# Patient Record
Sex: FEMALE | Race: WHITE
[De-identification: names, ages, dates, MRNs, and addresses within clinical notes are randomized per-mention and may not be internally consistent; named-entity substitution may affect disease eponyms.]

---

## 2020-10-19 ENCOUNTER — HOSPITAL ENCOUNTER (EMERGENCY)
Dept: HOSPITAL 97 - ER | Age: 25
Discharge: HOME | End: 2020-10-19
Payer: COMMERCIAL

## 2020-10-19 VITALS — DIASTOLIC BLOOD PRESSURE: 89 MMHG | SYSTOLIC BLOOD PRESSURE: 121 MMHG | TEMPERATURE: 98 F

## 2020-10-19 VITALS — OXYGEN SATURATION: 100 %

## 2020-10-19 DIAGNOSIS — F17.210: ICD-10-CM

## 2020-10-19 DIAGNOSIS — Z88.3: ICD-10-CM
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
## 2020-10-19 DIAGNOSIS — Z88.8: ICD-10-CM

## 2020-10-19 DIAGNOSIS — S62.366A: Primary | ICD-10-CM
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
## 2020-10-19 PROCEDURE — 81025 URINE PREGNANCY TEST: CPT

## 2020-10-19 PROCEDURE — 99284 EMERGENCY DEPT VISIT MOD MDM: CPT

## 2020-10-19 PROCEDURE — 2W3CX1Z IMMOBILIZATION OF RIGHT LOWER ARM USING SPLINT: ICD-10-PCS

## 2020-10-19 PROCEDURE — 96372 THER/PROPH/DIAG INJ SC/IM: CPT

## 2020-10-19 PROCEDURE — 81003 URINALYSIS AUTO W/O SCOPE: CPT

## 2020-10-19 NOTE — EDPHYS
Physician Documentation                                                                           

 [facility]                                                                 

Name: Lindy Gibson                                                                           

Age: 25 yrs                                                                                       

Sex: Female                                                                                       

: 1995                                                                                   

MRN: [id number]                                                                                   

Arrival Date: 10/19/2020                                                                          

Time: 00:19                                                                                       

Account#: [id number]                                                                            

Bed 8                                                                                             

Private MD: Javier Boyle                                                                         

ED Physician Adin Early                                                                     

OB/GYN:                                                                                           

10/19                                                                                             

00:36 LMP 10/4/2020                                                                           rv  

                                                                                                  

Historical:                                                                                       

- Allergies:                                                                                      

00:35 Clindamycin;                                                                            rv  

00:35 STEROIDS;                                                                               rv  

- Home Meds:                                                                                      

00:35 None [Active];                                                                          rv  

- PMHx:                                                                                           

00:35 None;                                                                                   rv  

- PSHx:                                                                                           

00:35 None;                                                                                   rv  

                                                                                                  

- Immunization history:: Adult Immunizations up to date.                                          

- Social history:: Smoking status: Patient reports the use of cigarette tobacco                   

  products, smokes one pack cigarettes per day.                                                   

                                                                                                  

                                                                                                  

Vital Signs:                                                                                      

00:29  / 93; Pulse 114; Resp 19; Temp 98.3; Pulse Ox 100% ; Weight 54.43 kg; Height 5   rv  

      ft. 4 in. (162.56 cm); Pain 8/10;                                                           

01:39  / 89; Pulse 106; Resp 16; Temp 98; Pulse Ox 100% on R/A;                         rv  

00:29 Body Mass Index 20.60 (54.43 kg, 162.56 cm)                                             rv  

                                                                                                  

MDM:                                                                                              

00:20 Patient medically screened.                                                             tw4 

                                                                                                  

10/19                                                                                             

00:43 Order name: Urine Pregnancy--Ancillary (enter results)                                  ds4 

10/19                                                                                             

00:43 Order name: Urine Dipstick--Ancillary (enter results)                                   ds4 

10/19                                                                                             

00:32 Order name: XRAY Hand RIGHT 3 View                                                      ds4 

10/19                                                                                             

00:33 Order name: Urine Pregnancy Test (obtain specimen); Complete Time: 00:42                ds4 

10/19                                                                                             

00:37 Order name: Urine Dipstick-Ancillary (obtain specimen); Complete Time: 00:42            rv  

10/19                                                                                             

01:37 Order name: Ulnar Gutter splint; Complete Time: 01:37                                   rv  

                                                                                                  

Administered Medications:                                                                         

01:11 Drug: TORadol 60 mg Route: IM; Site: left deltoid;                                      ll2 

01:37 Follow up: Response: No adverse reaction; Marked relief of symptoms; Pain is decreased  rv  

                                                                                                  

                                                                                                  

Disposition:                                                                                      

10/19/20 01:25 Discharged to Home. Impression: Nondisplaced fracture of neck of fifth             

  metacarpal bone, right hand.                                                                    

- Condition is Stable.                                                                            

- Discharge Instructions: Metacarpal Fracture, Easy-to-Read.                                      

- Prescriptions for Ibuprofen 800 mg Oral Tablet - take 1 tablet by ORAL route every 8            

  hours As needed take with food; 30 tablet.                                                      

- Medication Reconciliation Form, Thank You Letter, Antibiotic Education, Prescription            

  Opioid Use form.                                                                                

- Follow up: Private Physician; When: Upon discharge from the Emergency Department;               

  Reason: Recheck today's complaints, Continuance of care, Re-evaluation by your                  

  physician. Follow up: Joel Sarmiento MD; When: Upon discharge from the Emergency               

  Department; Reason: Recheck today's complaints, Continuance of care, Re-evaluation by           

  your physician. Follow up: Tanmay العلي MD; When: Upon discharge from the                   

  Emergency Department; Reason: Recheck today's complaints, Continuance of care,                  

  Re-evaluation by your physician.                                                                

                                                                                                  

                                                                                                  

                                                                                                  

Signatures:                                                                                       

Dispatcher [employer]                           EDMS                                                 

Kyle Mathis                             ds4                                                  

Adin Early MD MD   tw4                                                  

Khurram Martin RN                    RN                                                      

Yamileth Kim RN                    RN   ll2                                                  

                                                                                                  

Corrections: (The following items were deleted from the chart)                                    

01:40 01:25 10/19/2020 01:25 Discharged to Home. Impression: Nondisplaced fracture of neck of rv  

      fifth metacarpal bone, right hand. Condition is Stable. Forms are Medication                

      Reconciliation Form, Thank You Letter, Antibiotic Education, Prescription Opioid Use.       

      Follow up: Private Physician; When: Upon discharge from the Emergency Department;           

      Reason: Recheck today's complaints, Continuance of care, Re-evaluation by your              

      physician. Follow up: Joel Sarmiento; When: Upon discharge from the Emergency                

      Department; Reason: Recheck today's complaints, Continuance of care, Re-evaluation by       

      your physician. Follow up: Tanmay العلي; When: Upon discharge from the Emergency          

      Department; Reason: Recheck today's complaints, Continuance of care, Re-evaluation by       

      your physician. tw4                                                                         

                                                                                                  

**************************************************************************************************

## 2020-10-19 NOTE — RAD REPORT
EXAM DESCRIPTION:  RAD - Hand Right 3 View - 10/19/2020 12:51 am

 

CLINICAL HISTORY:  Right hand pain status post injury

 

FINDINGS:  Comminuted avulsion fracture involves fifth metacarpal head which extends intraarticularly
.

 

No dislocation
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
## 2021-11-18 ENCOUNTER — HOSPITAL ENCOUNTER (EMERGENCY)
Dept: HOSPITAL 97 - ER | Age: 26
LOS: 1 days | Discharge: HOME | End: 2021-11-19
Payer: COMMERCIAL

## 2021-11-18 DIAGNOSIS — F32.A: ICD-10-CM

## 2021-11-18 DIAGNOSIS — F12.90: ICD-10-CM

## 2021-11-18 DIAGNOSIS — Z88.3: ICD-10-CM

## 2021-11-18 DIAGNOSIS — F15.951: Primary | ICD-10-CM

## 2021-11-18 LAB
ALBUMIN SERPL BCP-MCNC: 4 G/DL (ref 3.4–5)
ALP SERPL-CCNC: 60 U/L (ref 45–117)
ALT SERPL W P-5'-P-CCNC: 36 U/L (ref 12–78)
AST SERPL W P-5'-P-CCNC: 35 U/L (ref 15–37)
BUN BLD-MCNC: 11 MG/DL (ref 7–18)
GLUCOSE SERPLBLD-MCNC: 95 MG/DL (ref 74–106)
HCT VFR BLD CALC: 35.4 % (ref 36–45)
INR BLD: 1.03
LYMPHOCYTES # SPEC AUTO: 1.4 K/UL (ref 0.7–4.9)
METHAMPHET UR QL SCN: POSITIVE
PMV BLD: 7.5 FL (ref 7.6–11.3)
POTASSIUM SERPL-SCNC: 3.7 MMOL/L (ref 3.5–5.1)
RBC # BLD: 3.99 M/UL (ref 3.86–4.86)
SP GR UR: 1.02 (ref 1–1.03)
THC SERPL-MCNC: POSITIVE NG/ML

## 2021-11-18 PROCEDURE — 93005 ELECTROCARDIOGRAM TRACING: CPT

## 2021-11-18 PROCEDURE — 96374 THER/PROPH/DIAG INJ IV PUSH: CPT

## 2021-11-18 PROCEDURE — 99284 EMERGENCY DEPT VISIT MOD MDM: CPT

## 2021-11-18 PROCEDURE — 80076 HEPATIC FUNCTION PANEL: CPT

## 2021-11-18 PROCEDURE — 81025 URINE PREGNANCY TEST: CPT

## 2021-11-18 PROCEDURE — 85025 COMPLETE CBC W/AUTO DIFF WBC: CPT

## 2021-11-18 PROCEDURE — 80329 ANALGESICS NON-OPIOID 1 OR 2: CPT

## 2021-11-18 PROCEDURE — 80307 DRUG TEST PRSMV CHEM ANLYZR: CPT

## 2021-11-18 PROCEDURE — 96361 HYDRATE IV INFUSION ADD-ON: CPT

## 2021-11-18 PROCEDURE — 80320 DRUG SCREEN QUANTALCOHOLS: CPT

## 2021-11-18 PROCEDURE — 81003 URINALYSIS AUTO W/O SCOPE: CPT

## 2021-11-18 PROCEDURE — 85610 PROTHROMBIN TIME: CPT

## 2021-11-18 PROCEDURE — 36415 COLL VENOUS BLD VENIPUNCTURE: CPT

## 2021-11-18 PROCEDURE — 85730 THROMBOPLASTIN TIME PARTIAL: CPT

## 2021-11-18 PROCEDURE — 80048 BASIC METABOLIC PNL TOTAL CA: CPT

## 2021-11-18 PROCEDURE — 82550 ASSAY OF CK (CPK): CPT

## 2021-11-18 PROCEDURE — 70450 CT HEAD/BRAIN W/O DYE: CPT

## 2021-11-18 NOTE — RAD REPORT
EXAM DESCRIPTION:  CT - Head Brain Wo Cont - 11/18/2021 8:23 pm

 

CLINICAL HISTORY:  AMS

 

COMPARISON:  HEAD BRAIN W O CONTRAST dated 2/14/2012

 

TECHNIQUE:  All CT scans are performed using dose optimization technique as appropriate and may inclu
de automated exposure control or mA/KV adjustment according to patient size.

 

FINDINGS:  No intracranial hemorrhage, hydrocephalus or extra-axial fluid collection.No areas of brai
n edema or evidence of midline shift.

 

The paranasal sinuses and mastoids are clear. The calvarium is intact.

 

IMPRESSION:  No acute intracranial abnormality.

## 2021-11-18 NOTE — XMS REPORT
Continuity of Care Document

                          Created on:2021



Patient:REGINA MCLAUGHLIN

Sex:Female

:1995

External Reference #:[id number]





Demographics







                          Address                   [address] [place] [address]

 

                          Home Phone                [phone]

 

                          Mobile Phone              [phone]

 

                          Email Address             [email or web address]

 

                          Preferred Language        en

 

                          Marital Status            Unknown

 

                          Nondenominational Affiliation     Unknown

 

                          Race                      Unknown

 

                          Additional Race(s)        Unavailable



                                                    White

 

                          Ethnic Group              Unknown









Author







                          Organization              [facility]

t

 

                          Address                   [address]



                                                    [place] [address]

 

                          Phone                     [phone]









Support







                Name            Relationship    Address         Phone

 

                1               BERNARD           Unavailable     Unavailable

 

                2               BERNARD           Unavailable     Unavailable

 

                3               Unavailable     Unavailable     Unavailable

 

                LISSETTE        MOM             [address]   [phone]



                                                [place] [address] 

 

                Lissette        Unavailable     [address]   [phone]



                                                [place] [address] 









Care Team Providers







                    Name                Role                Phone

 

                    Pcp,  Does Not Have A Primary Care Physician [phone]

 

                    KRISTEL UREÑA       Attending Clinician Unavailable

 

                    Suzie Raman MD        Attending Clinician [phone]

 

                    Doctor Unassigned,  Name Attending Clinician Unavailable

 

                    SUZIE RAMAN           Attending Clinician Unavailable

 

                    ETHEL            Attending Clinician Unavailable









Payers







           Payer Name Policy Type Policy Number Effective Date Expiration Date S

isabel

 

           BCBS       2          [id number] 2021 00:00:00            







Problems







       Condition Condition Condition Status Onset  Resolution Last   Treating Co

mments 

Source



       Name   Details Category        Date   Date   Treatment Clinician        



                                                 Date                 

 

       No known No known Disease                                           Unive

rs



       active active                                                  ity of



       problems problems                                                  [facility]







Allergies, Adverse Reactions, Alerts







       Allergy Allergy Status Severity Reaction(s) Onset  Inactive Treating Comm

ents 

Source



       Name   Type                        Date   Date   Clinician        

 

       CLINDAMY DRUG   Active High   Unknown-Cmnt 2021                      Un

dennise



       DANILO HCL INGREDI                      0-26                        ity of



                                          00:00:                      [place]



                                          00                          Medical



                                                                      Branch

 

       Clindamy Propensi Active        Unknown - 2021                      Uni

vers



       danilo Hcl ty to                See comments 0-26                        ity

 of



              adverse                      00:00:                      [place]



              reaction                                                Medical



              s                                                       [facility]

 

       NO KNOWN Drug   Active                                           Univers



       ALLERGIE Class                                                   ity of



       S                                                              [facility]







Social History







           Social Habit Start Date Stop Date  Quantity   Comments   Source

 

           History of                       Cigarette Smoker            Universi

ty of



           tobacco use                                             [place] [facility]



                                                                  [facility]

 

           History [facility]                                             University o

f



           Alcohol Std                                             [place] [facility]



           Drinks                                                 [facility]

 

           History [facility] o

f



           Alcohol Binge                                             [place] [facility]

al



                                                                  Branch

 

           History [facility] o

f



           Alcohol Comment                                             [place] [facility]

ical



                                                                  Branch

 

           Alcohol intake 2021-10-29 2021-10-29 Ex-drinker            University

 of



                      00:00:00   00:00:00   (finding)             [facility]

 

           Tobacco use and 2021-10-26 2021-10-26 Never used            Universit

y of



           exposure   00:00:00   00:00:00                         [place] [facility]



                                                                  [facility]

 

           History SDOH 2021-10-26 2021-10-26 1                     University o

f



           Alcohol Frequency 00:00:00   00:00:00                         [facility]

edical



                                                                  [facility]

 

           Sex Assigned At 1995                       Universit

y of



           Birth      00:00:00   00:00:00                         [facility]









                Smoking Status  Start Date      Stop Date       Source

 

                Unknown if ever smoked                                 Universit

y of [facility]

 

                Current every day smoker 2021-10-26 00:00:00                 Uni

versity [facility]







Medications







       Ordered Filled Start  Stop   Current Ordering Indication Dosage Frequency

 Signature

                    Comments            Components          Source



     Medication Medication Date Date Medication? Clinician                (SIG) 

          



     Name Name                                                   

 

     ibuprofen      2021- No        [id number] 600mg                     U

nivers



     (IBU)      0-29 10-29                                         ity of



     tablet 600      22:15: 21:10                                         [place]



     mg        00   :00                                          Medical



                                                                 Branch

 

     ibuprofen      2021- No        [id number] 600mg      600 mg,         

  Univers



     (IBU)      0-29 10-29                          Oral,           ity of



     tablet 600      22:15: 21:10                          ONCE, 1           [facility]

as



     mg        00   :00                           dose, On           Medical



                                                  Fri            Branch



                                                  10/29/21           



                                                  at 1715,           



                                                  Routine           

 

     levonorgest      2021- No        [id number] 1{devic                  

   Univers



     reL       0-29 10-29                e}                       ity of



     (KYLEENA)      21:30: 20:29                                         [place]



     IUD 1      00   :00                                          Medical



     Device                                                        Branch

 

     levonorgest      2021- No        [id number] 1{devic      1 Device,   

        Univers



     reL       0-29 10-29                e}        Intrauteri           ity of



     (KYLEENA)      21:30: 20:29                          ne, ONCE,           [place]

[place]



     IUD 1      00   :00                           1 dose, On           Medical



     Device                                         Fri            Branch



                                                  10/29/21           



                                                  at 1630,           



                                                  Routine           

 

     miSOPROStoL      2021- No        [id number]           Take one       

    Univers



     200 mcg      0-26 10-29                          tablet           ity of



     tablet      00:00: 00:00                          night           [place]



               00   :00                           before           Medical



                                                  procedure,           Branch



                                                  then take           



                                                  one tablet           



                                                  morning of           



                                                  procedure           

 

     clonazePAM      2021      Yes                                     Univers



     0.5 mg      0-11                                              ity of



     tablet      00:00:                                              [place]



               00                                                Medical



                                                                 Branch

 

     clonazePAM      2021      Yes                                     Univers



     0.5 mg      0-11                                              ity of



     tablet      00:00:                                              [place]



               00                                                Medical



                                                                 Branch

 

     clonazePAM      2021      Yes                                     Univers



     0.5 mg      0-11                                              ity of



     tablet      00:00:                                              [place]



               00                                                Medical



                                                                 Branch

 

     amoxicillin      2021- No                       TAKE ONE           U

nivers



     -clavulanat      9-16 10-29                          (1) TABLET           i

ty of



     e 875-125      00:00: 00:00                          BY MOUTH           [facility]

as



     mg per      00   :00                           TWICE A           Medical



     tablet                                         DAY.           Branch

 

     medroxyPROG      2021- No             150mg      150 mg by          

 Univers



     ESTERone      5-18 10-29                          Intramuscu           ity 

of



     150 mg/mL      00:00: 00:00                          lar route.           T

[place]



     injection      00   :00                                          Medical



                                                                 Branch

 

     QUEtiapine            Yes            1{tbl}      Take 1           Uni

vers



     25 mg      5-06                               tablet by           ity of



     tablet      00:00:                               mouth           [place]



               00                                 every           Medical



                                                  morning.           Branch

 

     QUEtiapine            Yes            1{tbl}      Take 1           Uni

vers



     25 mg      5-06                               tablet by           ity of



     tablet      00:00:                               mouth           [place]



               00                                 every           Medical



                                                  morning.           Branch

 

     QUEtiapine            Yes            1{tbl}      Take 1           Uni

vers



     25 mg      5-06                               tablet by           ity of



     tablet      00:00:                               mouth           [place]



               00                                 every           Medical



                                                  morning.           Branch

 

     lisdexamfet            Yes            30mg      Take 30 mg           

Univers



     amine      4-14                               by mouth.           ity of



     (VYVANSE)      00:00:                                              [place]



     30 mg      00                                                Medical



     capsule                                                        Branch

 

     lisdexamfet            Yes            40mg      Take 40 mg           

Univers



     amine      4-14                               by mouth.           ity of



     (VYVANSE)      00:00:                                              [place]



     40 mg      00                                                Medical



     capsule                                                        Branch

 

     venlafaxine            Yes            1{capsu      Take 1           U

nivers



     XR 75 mg 24      4-14                     le}       capsule by           it

y of



     hr capsule      00:00:                               mouth           [place]



               00                                 daily.           Medical



                                                                 Branch

 

     lisdexamfet            Yes            30mg      Take 30 mg           

Univers



     amine      4-14                               by mouth.           ity of



     (VYVANSE)      00:00:                                              [place]



     30 mg      00                                                Medical



     capsule                                                        Branch

 

     lisdexamfet            Yes            40mg      Take 40 mg           

Univers



     amine      4-14                               by mouth.           ity of



     (VYVANSE)      00:00:                                              [place]



     40 mg      00                                                Medical



     capsule                                                        Branch

 

     venlafaxine            Yes            1{capsu      Take 1           U

nivers



     XR 75 mg 24      4-14                     le}       capsule by           it

y of



     hr capsule      00:00:                               mouth           [place]



               00                                 daily.           Medical



                                                                 Branch

 

     lisdexamfet            Yes            30mg      Take 30 mg           

Univers



     amine      4-14                               by mouth.           ity of



     (VYVANSE)      00:00:                                              [place]



     30 mg      00                                                Medical



     capsule                                                        Branch

 

     lisdexamfet            Yes            40mg      Take 40 mg           

Univers



     amine      4-14                               by mouth.           ity of



     (VYVANSE)      00:00:                                              [place]



     40 mg      00                                                Medical



     capsule                                                        Branch

 

     venlafaxine            Yes            1{capsu      Take 1           U

nivers



     XR 75 mg 24      4-14                     le}       capsule by           it

y of



     hr capsule      00:00:                               mouth           [place]



               00                                 daily.           Medical



                                                                 Branch

 

     clonazePAM      2021- No                       TAKE 1           Univ

ers



     0.5 mg      4-14 10-29                          TABLET           ity of



     tablet      00:00: 00:00                          (0.5 MG)           [place]



               00   :00                           BY MOUTH 2           Medical



                                                  TIMES PER           Branch



                                                  DAY AS           



                                                  NEEDED           







Immunizations







           Ordered Immunization Filled Immunization Date       Status     Commen

ts   Source



           Name       Name                                        

 

           SARS-COV-2 COVID-19            2021 Completed             Unive

rsity of



           PFIZER VACCINE            00:00:00                         [facility]

 

           SARS-COV-2 COVID-19            2021 Completed             Unive

rsity of



           PFIZER VACCINE            00:00:00                         [facility]

 

           SARS-COV-2 COVID-19            2021 Completed             Unive

rsity of



           PFIZER VACCINE            00:00:00                         [facility] [place] [facility]

 

           SARS-COV-2 COVID-19            2021 Completed             Unive

rsity of



           PFIZER VACCINE            00:00:00                         [facility] [place] [facility]

 

           SARS-COV-2 COVID-19            2021 Completed             Unive

rsity of



           PFIZER VACCINE            00:00:00                         [facility]

 

           SARS-COV-2 COVID-19            2021 Completed             Unive

rsity of



           PFIZER VACCINE            00:00:00                         [facility]

 

           Influenza Virus            2020-10-28 Completed             Universit

y of



           Vaccine Quad IM            00:00:00                         [place] [facility]

ical



           Multi-dose 6+ MO                                             Branch

 

           Influenza Virus            2020-10-28 Completed             Universit

y of



           Vaccine Quad IM            00:00:00                         [place] [facility]

ical



           Multi-dose 6+ MO                                             Branch

 

           Influenza Virus            2020-10-28 Completed             Universit

y of



           Vaccine Quad IM            00:00:00                         [place] [facility]

ical



           Multi-dose 6+ MO                                             Branch

 

           Meningococcal            2016 Completed             University 

of



           Vaccine               00:00:00                         [facility]

 

           Meningococcal            2016 Completed             University 

of



           Vaccine               00:00:00                         [facility]

 

           Meningococcal            2016 Completed             University 

of



           Vaccine               00:00:00                         [facility]







Vital Signs







             Vital Name   Observation Time Observation Value Comments     Source

 

             Systolic blood 2021-10-29 20:03:00 118 mm[Hg]                Univer

sity of



             pressure                                            [facility]

 

             Diastolic blood 2021-10-29 20:03:00 76 mm[Hg]                 Unive

rsity of



             pressure                                            [facility]

 

             Heart rate   2021-10-29 20:03:00 91 /min                   [facility]

 

             Body temperature 2021-10-29 20:03:00 36.72 Stella                 [facility]

 

             Respiratory rate 2021-10-29 20:03:00 16 /min                   [facility]

 

             Body height  2021-10-29 20:03:00 162.6 cm                  [facility]

 

             Body weight  2021-10-29 20:03:00 62.143 kg                 [facility]

 

             BMI          2021-10-29 20:03:00 23.52 kg/m2               [facility]







Procedures







                Procedure       Date / Time Performed Performing Clinician Sourc

e

 

                EXTERNAL PROVIDER 2021 06:01:00 Doctor Unassigned, No [facility]

 

                POCT PREGNANCY TEST 2021-10-29 00:00:00 Debbie Raman   [facility]

 

                ASSIGNMENT OF BENEFITS 2021-10-26 20:00:51 Doctor Unassigned, No

 [facility]







Encounters







        Start   End     Encounter Admission Attending Care    Care    Encounter 

Source



        Date/Time Date/Time Type    Type    Clinicians Facility Department ID   

   

 

        2021-12-10 2021-12-10 TARAH Mae  [id number]

[id number]



        08:00:00 08:00:00                                                 [facility]

d

 

        2021 Telephone         Danisha [facility] [id number] 

[id number] 

Univers



        00:00:00 00:00:00                 Suzie HUMPHRIES [id number]         it

y of



                                                [facility] [id number]         [place]

[place]



                                                [facility]  [phone]         [address]

 

        2021 Orders          Doctor  MANAN    [id number]

[id number] Univers



        00:00:00 00:00:00 Only            Unassigned, CHELSEA   [id number]       

  ity of



                                        [facility] [id number]         [facility]

as



                                                        [phone]         [address]

 

        2021-10-29 2021-10-29 Outpatient R       DANISHA [facility]    [id number]

[id number] Univers



        14:30:00 15:27:45                                                 ity [facility]

 

        2021-10-29 2021-10-29 [facility] [id number]

[id number] Univers



        14:29:45 15:27:45 Visit           Suzie HUMPHRIES [id number]         it

y of



                                                [facility] [id number]         [place]

[place]



                                                HEALTH  [id number]         [address]

 

        2021-10-29 2021-10-29 Outpatient R       DANISHA [facility]    [id number]

[id number] Univers



        14:30:00 14:30:00                                         [id number]  ity [facility]

 

        2021-10-26 2021-10-26 [facility]    [id number]

[id number] Univers



        15:00:00 15:45:58                 NATASHA                           ity [facility]

 

        2021-10-26 2021-10-26 Orders          Doctor  MANAN    [id number]

[id number] Univers



        00:00:00 00:00:00 Only            Unassigned, CHELSEA   [id number]       

  ity of



                                        [facility] [id number]         [facility]

as



                                                        [phone]         [address]

 

        2021 Outpatient         TARAH UREÑA  [id number]

[address]



        08:00:00 08:00:00                                                 [place]

d

 

        2020 Outpatient                 [facility]  [facility]  [id number]

 [facility]



        00:00:00 00:00:00                                                 [facility]



                                                                        ent



                                                                        Clinics







Results







           Test Description Test Time  Test Comments Results    Result Comments 

Source









                    POCT PREGNANCY TEST 2021-10-29 19:54:00 









                      Test Item  Value      Reference Range Interpretation Comme

nts









             POCT PREG (test code = 1605) Negative                              

 

 

             On board controls acceptable with C Line (test code = 3574) Yes    

                                

 

             POCT PREG LOT # (test code = 3575)                                 

       

 

             POCT PREG TEST  DATE (test code = 3576)                      

                  



[facility]

## 2021-11-19 VITALS — TEMPERATURE: 97.6 F | SYSTOLIC BLOOD PRESSURE: 114 MMHG | DIASTOLIC BLOOD PRESSURE: 63 MMHG

## 2021-11-19 VITALS — OXYGEN SATURATION: 98 %

## 2021-11-19 NOTE — EKG
Test Date:    2021-11-18               Test Time:    20:14:59

Technician:   CLEMENT                                     

                                                     

MEASUREMENT RESULTS:                                       

Intervals:                                           

Rate:         87                                     

DC:           122                                    

QRSD:         86                                     

QT:           368                                    

QTc:          442                                    

Axis:                                                

P:            47                                     

DC:           122                                    

QRS:          102                                    

T:            47                                     

                                                     

INTERPRETIVE STATEMENTS:                                       

                                                     

Normal sinus rhythm

Rightward axis

Borderline ECG

Compared to ECG 11/18/2021 20:14:00

No significant changes



Electronically Signed On 11-19-21 13:16:20 CST by Jarad Rueda

## 2021-11-19 NOTE — EKG
Test Date:    2021-11-18               Test Time:    20:14:00

Technician:   CLEMENT                                     

                                                     

MEASUREMENT RESULTS:                                       

Intervals:                                           

Rate:         81                                     

KS:           122                                    

QRSD:         84                                     

QT:           368                                    

QTc:          427                                    

Axis:                                                

P:            54                                     

KS:           122                                    

QRS:          100                                    

T:            49                                     

                                                     

INTERPRETIVE STATEMENTS:                                       

                                                     

Normal sinus rhythm

Rightward axis

Borderline ECG

No previous ECG available for comparison



Electronically Signed On 11-19-21 13:16:22 CST by Jarad Rueda

## 2021-11-19 NOTE — EDPHYS
Physician Documentation                                                                           

 [facility]                                                                 

Name: Lindy Gibson                                                                           

Age: 26 yrs                                                                                       

Sex: Female                                                                                       

: 1995                                                                                   

MRN: [id number]                                                                                   

Arrival Date: 2021                                                                          

Time: 18:42                                                                                       

Account#: [id number]                                                                            

Bed 18                                                                                            

Private MD:                                                                                       

ED Physician Matthew Martinez                                                                      

HPI:                                                                                              

                                                                                             

19:40 This 26 yrs old Female presents to ER via Ambulatory with complaints of Drug Abuse,     mh7 

      hallucination.                                                                              

19:40 The patient presents to the emergency department with psychosis, has experienced        mh7 

      auditory hallucinations, Voices calling her name, has experienced visual                    

      hallucinations, a history of substance abuse, Type: Adderall, not prescription, Past        

      few days.                                                                                   

19:40 Onset: The symptoms/episode began/occurred 1 day(s) ago.                                mh7 

19:40 Past psychiatric history: Prior diagnosis: depression, Anxiety, ADHD, Psychiatric       mh7 

      medications include: Effexor, Wellbutrin, Clonazepam, Primary psychiatric physician:        

      the patient has not had a prior suicide gesture, the patient does not have a previous       

      inpatient psychiatric history, the patient's last psychiatric treatment was none.           

      Associated signs and symptoms: Pertinent positives; anxiety, hallucinations, substance      

      abuse, Pertinent negatives: abdominal pain, chest pain, chills, delusions, depression,      

      fever, headache, homicidal ideation, nausea, night sweats, palpitations, paranoia,          

      shortness of breath, suicide ideation, tremor, vomiting. Severity of symptoms: At their     

      worst the symptoms were moderate last night, in the emergency department the symptoms       

      have improved moderately. Patient admitted to taking Adderall which she got from            

      someone that was not prescribed to her. She states she has been having auditory and         

      visual hallucinations. She denies any suicidal or homicidal ideation. Mother states         

      that she has been acting strange such as talking to her door and trying to order food       

      from it. She denies any trauma or injuries. She denies any headache, chest pain,            

      abdominal pain, fever, shortness of breath, cough, nausea, vomiting, dizziness,             

      numbness/tingling, or weakness..                                                            

                                                                                                  

OB/GYN:                                                                                           

18:52 LMP 11/15/2021                                                                          ss  

                                                                                                  

Historical:                                                                                       

- Allergies:                                                                                      

18:52 Clindamycin;                                                                            ss  

18:52 steroids;                                                                               ss  

- Home Meds:                                                                                      

18:52 Effexor 75 mg Oral tab 1 tab 2 times per day [Active]; Wellbutrin Oral [Active];        ss  

      Vyvanse oral [Active]; Seroquel Oral [Active];                                              

- PMHx:                                                                                           

18:52 Depressive disorder; Anxiety; ADHD;                                                     ss  

- PSHx:                                                                                           

18:52 None;                                                                                   ss  

                                                                                                  

- Immunization history:: Client reports receiving the 2nd dose of the Covid vaccine.              

- Social history:: Smoking status: Patient denies any tobacco usage or history of.                

                                                                                                  

                                                                                                  

ROS:                                                                                              

19:40 Constitutional: Negative for fever, chills, and weight loss, Eyes: Negative for injury, mh7 

      pain, redness, and discharge, ENT: Negative for injury, pain, and discharge, Neck:          

      Negative for injury, pain, and swelling, Cardiovascular: Negative for chest pain,           

      palpitations, and edema, Respiratory: Negative for shortness of breath, cough,              

      wheezing, and pleuritic chest pain, Abdomen/GI: Negative for abdominal pain, nausea,        

      vomiting, diarrhea, and constipation, Back: Negative for injury and pain, : Negative      

      for injury, bleeding, discharge, and swelling, MS/Extremity: Negative for injury and        

      deformity, Skin: Negative for injury, rash, and discoloration, Neuro: Negative for          

      headache, weakness, numbness, tingling, and seizure, Allergy/Immunology: Negative for       

      hives, rash, and allergies, Endocrine: Negative for neck swelling, polydipsia,              

      polyuria, polyphagia, and marked weight changes, Hematologic/Lymphatic: Negative for        

      swollen nodes, abnormal bleeding, and unusual bruising.                                     

                                                                                                  

Exam:                                                                                             

19:40 Head/Face:  Normocephalic, atraumatic. Eyes:  Pupils equal round and reactive to light, mh7 

      extra-ocular motions intact.  Lids and lashes normal.  Conjunctiva and sclera are           

      non-icteric and not injected.  Cornea within normal limits.  Periorbital areas with no      

      swelling, redness, or edema. Neck:  Trachea midline, no thyromegaly or masses palpated,     

      and no cervical lymphadenopathy.  Supple, full range of motion without nuchal rigidity,     

      or vertebral point tenderness.  No Meningismus. Chest/axilla:  Normal chest wall            

      appearance and motion.  Nontender with no deformity.  No lesions are appreciated.           

19:40 Respiratory:  Lungs have equal breath sounds bilaterally, clear to auscultation and         

      percussion.  No rales, rhonchi or wheezes noted.  No increased work of breathing, no        

      retractions or nasal flaring. Abdomen/GI:  Soft, non-tender, with normal bowel sounds.      

      No distension or tympany.  No guarding or rebound.  No evidence of tenderness               

      throughout. Back:  No spinal tenderness.  No costovertebral tenderness.  Full range of      

      motion. Skin:  Warm, dry with normal turgor.  Normal color with no rashes, no lesions,      

      and no evidence of cellulitis. MS/ Extremity:  Pulses equal, no cyanosis.                   

      Neurovascular intact.  Full, normal range of motion. Neuro:  Awake and alert, GCS 15,       

      oriented to person, place, time, and situation.  Cranial nerves II-XII grossly intact.      

      Motor strength 5/5 in all extremities.  Sensory grossly intact.  Cerebellar exam            

      normal.  Normal gait.                                                                       

19:40 Constitutional: The patient appears in no acute distress, alert, awake, anxious.            

19:40 Cardiovascular: Rate: tachycardic, Rhythm: regular, Pulses: no pulse deficits are           

      appreciated, Heart sounds: normal, normal S1and S2, Edema: is not appreciated, JVD: is      

      not appreciated.                                                                            

19:40 Psych: Behavior/mood is cooperative, anxious, Oriented to person, place, time, Patient      

      has no thoughts/intents to harm self or others. Judgement / Insight is normal. Memory       

      is normal. Delusions/hallucinations are present and described as Hearing voices calling     

      her name intermittently.                                                                    

                                                                                                  

Vital Signs:                                                                                      

18:49  / 96; Pulse 120; Resp 22; Temp 98.5(TE); Pulse Ox 100% on R/A; Weight 63.5 kg;   ss  

      Height 5 ft. 4 in. (162.56 cm); Pain 0/10;                                                  

19:52  / 74; Pulse 92; Resp 20; Pulse Ox 100% on R/A;                                   cc4 

20:30  / 101; Pulse 100; Resp 22; Pulse Ox 100% on R/A;                                 cc4 

21:23  / 53; Pulse 106; Resp 20; Pulse Ox 98% on R/A;                                   ld1 

22:30  / 51; Pulse 101; Resp 20 S; Pulse Ox 98% on R/A;                                 cc4 

23:30  / 53; Pulse 99; Resp 16; Pulse Ox 98% ;                                          cc4 

                                                                                             

00:15  / 63; Pulse 97; Resp 20; Temp 97.6(O); Pulse Ox 98% ;                            cc4 

                                                                                             

18:49 Body Mass Index 24.03 (63.50 kg, 162.56 cm)                                             ss  

                                                                                                  

MDM:                                                                                              

00:21 Differential diagnosis: drug withdrawal. acute psychotic break, psychosis secondary to  [facility] 

      non-compliance, Substance abuse. Data reviewed: vital signs, nurses notes, lab test         

      result(s), CBC, drug level(s), acetaminophen, alcohol, electrolytes, urinalysis, urine      

      drug screen, UPT: negative EKG, radiologic studies, CT scan. Data interpreted: Pulse        

      oximetry: on room air is 98 %. Interpretation: normal. Counseling: I had a detailed         

      discussion with the patient and/or guardian regarding: the historical points, exam          

      findings, and any diagnostic results supporting the discharge/admit diagnosis, lab          

      results, radiology results, the need for outpatient follow up, a psychiatrist. Response     

      to treatment: the patient's symptoms have resolved after treatment, the patient's blood     

      pressure is in an acceptable range, mental status has returned to baseline, the patient     

      no longer shows bradycardia, the patient is not short of breath, the patient is not         

      tachycardic, the patient's pain is gone, the patient's temperature has normalized.          

00:25 Patient medically screened.                                                             [facility] 

                                                                                                  

                                                                                             

19:27 Order name: Acetaminophen; Complete Time: 21:14                                         [facility] 

                                                                                             

19:27 Order name: Basic Metabolic Panel; Complete Time: 21:14                                 [facility] 

                                                                                             

19:27 Order name: CBC with Diff; Complete Time: 20:44                                         [facility] 

                                                                                             

19:27 Order name: ETOH Level; Complete Time: 21:14                                            [facility] 

                                                                                             

19:27 Order name: Hepatic Function; Complete Time: 21:14                                      [facility] 

                                                                                             

19:27 Order name: PT-INR; Complete Time: 20:44                                                [facility] 

                                                                                             

19:27 Order name: Ptt, Activated; Complete Time: 20:44                                        [facility] 

                                                                                             

19:27 Order name: Salicylate; Complete Time: 20:44                                            [facility] 

                                                                                             

19:27 Order name: Urine Drug Screen; Complete Time: 21:14                                     [facility] 

                                                                                             

19:28 Order name: CT Head Brain wo Cont; Complete Time: 20:44                                 [facility] 

                                                                                             

20:30 Order name: Urine Dipstick-Ancillary; Complete Time: 20:44                              [facility]

                                                                                             

20:33 Order name: Urine Pregnancy--Ancillary (enter results); Complete Time: 21:14            cs9 

                                                                                             

20:48 Order name: CPK; Complete Time: 21:28                                                   7 

                                                                                             

19:27 Order name: EKG; Complete Time: 19:28                                                   [facility] 

                                                                                             

19:27 Order name: EKG - Nurse/Tech; Complete Time: 20:41                                      7 

                                                                                             

19:27 Order name: IV Saline Lock; Complete Time: 20:41                                        [facility] 

                                                                                             

19:27 Order name: Labs collected and sent; Complete Time: 20:41                               [facility] 

                                                                                             

19:27 Order name: Suicide Screening ([place]); Complete Time: 20:43                          [facility] 

                                                                                             

19:27 Order name: Urine Dipstick-Ancillary (obtain specimen); Complete Time: 20:41            [facility] 

                                                                                             

19:27 Order name: Urine Pregnancy Test (obtain specimen); Complete Time: 20:41                [facility] 

                                                                                                  

Administered Medications:                                                                         

                                                                                             

19:50 Drug: NS 0.9% 1000 ml Route: IV; Rate: 1000 ml; Site: right antecubital;                cc4 

                                                                                             

00:15 Follow up: Response: No adverse reaction; IV Status: Completed infusion; IV Intake:     cc4 

      1000ml                                                                                      

                                                                                             

20:40 Drug: Ativan (LORazepam) 1 mg Route: IVP; Site: right wrist;                            cc4 

21:00 Follow up: Response: No adverse reaction; Anxiety decreased                             cc4 

22:20 Drug: D5-NS 1000 ml Route: IV; Rate: per protocol; Site: right wrist;                   vg1 

                                                                                                  

                                                                                                  

Disposition Summary:                                                                              

21 00:25                                                                                    

Discharge Ordered                                                                                 

      Location: Home                                                                          [facility] 

      Problem: new                                                                            [facility] 

      Symptoms: have improved                                                                 [facility] 

      Condition: Stable                                                                       [facility] 

      Diagnosis                                                                                   

        - Methamphetamine Use, Psychosis                                                      7 

        - Cannabinoid Use                                                                     [facility] 

      Followup:                                                                               7 

        - With: Private Physician                                                                  

        - When: 1 - 2 days                                                                         

        - Reason: Worsening of condition, Recheck today's complaints, Continuance of care,         

      Re-evaluation by your physician                                                             

      Followup:                                                                               [facility] 

        - With: Jerry Carey MD                                                             

        - When: 1 - 2 days                                                                         

        - Reason: Worsening of condition, Recheck today's complaints, Continuance of care,         

      Re-evaluation by your physician                                                             

      Discharge Instructions:                                                                     

        - Discharge Summary Sheet                                                             7 

        - Cannabis Use Disorder                                                               7 

        - Methamphetamines Use Disorder                                                       7 

        - Psychosis                                                                           [facility] 

      Forms:                                                                                      

        - Medication Reconciliation Form                                                      mh7 

        - Thank You Letter                                                                    mh7 

        - Antibiotic Education                                                                mh7 

        - Prescription Opioid Use                                                             mh7 

        - School release form                                                                 cc4 

Signatures:                                                                                       

Dispatcher [employer]                           Nat Aldana RN                      RN   ss                                                   

Taylor Carr RN                    RN   vg1                                                  

Matthew Martinez MD MD   7                                                  

Janette Gilbert RN                    RN   cc4                                                  

                                                                                                  

**************************************************************************************************

## 2021-11-19 NOTE — ER
Nurse's Notes                                                                                     

 [facility]                                                                 

Name: Lindy Gibson                                                                           

Age: 26 yrs                                                                                       

Sex: Female                                                                                       

: 1995                                                                                   

MRN: [id number]                                                                                   

Arrival Date: 2021                                                                          

Time: 18:42                                                                                       

Account#: [id number]                                                                            

Bed 18                                                                                            

Private MD:                                                                                       

Diagnosis: Methamphetamine Use, Psychosis;Cannabinoid Use                                         

                                                                                                  

Presentation:                                                                                     

                                                                                             

18:49 Chief complaint: Patient states: "I have been abusing the Adderall with Vyvanse and     ss  

      this most recent stuff I bought is crazy weird. I bought them off the street, they're       

      pressed. I flushed them, they are gone, but over the past three days, it's been high        

      use like 15 tablets a day. I'm starting to have auditory and visual hallucinations."        

      Denies SI/ HI. Coronavirus screen: Client denies travel out of the [place] in the last 14      

      days. Ebola Screen: Patient denies exposure to infectious person. Patient denies travel     

      to an Ebola-affected area in the 21 days before illness onset. Initial Sepsis Screen:       

      Does the patient meet any 2 criteria? No. Patient's initial sepsis screen is negative.      

      Does the patient have a suspected source of infection? No. Patient's initial sepsis         

      screen is negative. Risk Assessment: Do you want to hurt yourself or someone else?          

      Patient reports no desire to harm self or others. Onset of symptoms was 2021.                                                                                       

18:49 Method Of Arrival: Ambulatory                                                             

18:49 Acuity: TIFFANIE 3                                                                           ss  

                                                                                                  

OB/GYN:                                                                                           

18:52 LMP 11/15/2021                                                                          ss  

                                                                                                  

Historical:                                                                                       

- Allergies:                                                                                      

18:52 Clindamycin;                                                                            ss  

18:52 steroids;                                                                               ss  

- Home Meds:                                                                                      

18:52 Effexor 75 mg Oral tab 1 tab 2 times per day [Active]; Wellbutrin Oral [Active];        ss  

      Vyvanse oral [Active]; Seroquel Oral [Active];                                              

- PMHx:                                                                                           

18:52 Depressive disorder; Anxiety; ADHD;                                                     ss  

- PSHx:                                                                                           

18:52 None;                                                                                   ss  

                                                                                                  

- Immunization history:: Client reports receiving the 2nd dose of the Covid vaccine.              

- Social history:: Smoking status: Patient denies any tobacco usage or history of.                

                                                                                                  

                                                                                                  

Screenin:50 Abuse screen: Denies threats or abuse. Nutritional screening: No deficits noted.        cc4 

      Tuberculosis screening: No symptoms or risk factors identified. Fall Risk None              

      identified.                                                                                 

                                                                                                  

Assessment:                                                                                       

19:50 General: Appears Restless; appears to be "tweaking"; moving extremities frequently..    cc4 

      Behavior is cooperative, restless. Pain: Denies pain. Neuro: No deficits noted. Level       

      of Consciousness is awake, alert, obeys commands, Reports being confused PTA; mother        

      present.. Oriented to person, place, situation. Neuro: Reports Intermittent                 

      hallucinations \T\ confusion with hypermania x 2 days since ingesting "pink chalky"         

      substance "pills" that she purchased on "Street".. Denies wanting to harm self or           

      others.. Cardiovascular: No deficits noted. Capillary refill < 3 seconds Rhythm is          

      sinus rhythm. Respiratory: No deficits noted. Airway is patent Respiratory effort is        

      even, unlabored, Respiratory pattern is regular, symmetrical. GI: No signs and/or           

      symptoms were reported involving the gastrointestinal system. : No signs and/or           

      symptoms were reported regarding the genitourinary system. EENT: No deficits noted.         

      Eyes clear. Nares are clear Oral mucosa is dry. Derm: No deficits noted. Skin is            

      intact. Musculoskeletal: Capillary refill < 3 seconds, Range of motion: intact in all       

      extremities, Frequent, erratic movement noted of all extremities; appears to be             

      "tweaking".                                                                                 

19:50 Reassessment: No changes from previously documented assessment. [place] suicide        cc4 

      screening completed with denial of desire to harm self or others; # 20 g angiocath          

      inserted right wrist x 1 attempt \T\ converted to saline lock, roshni. well; blood drawn \T\   

      sent to lab; IV NS hung \T\ infusing \T\ bolus rate with no s/sx's of infiltration; parents   

      \T\ bedside; EKG done.                                                                        

20:15 Reassessment: To CT via stretcher.                                                      cc4 

20:30 Reassessment: No changes from previously documented assessment. Returned from CT via    cc4 

      stretcher.                                                                                  

20:40 Reassessment: No changes from previously documented assessment. Ativan 1 mg given slow  cc4 

      IVP, roshni. well.                                                                             

21:00 Reassessment: Patient appears in no apparent distress at this time. Sleeping; cessation cc4 

      of frequent movement of extremities; VSS; parents \T\ bedside; SR's x 2 up.                   

22:00 Reassessment: Patient appears in no apparent distress at this time. Sleeping; VSS.      cc4 

23:00 Reassessment: Patient appears in no apparent distress at this time. sleeping; VSS.      cc4 

                                                                                             

00:00 Reassessment: Patient appears in no apparent distress at this time. Awakened from sleep cc4 

      per Dr. Wright; VSS; disoriented \T\ moving extremities frequently dislodging saline      

      lock right wrist with bleeding controlled \T\ dsg applied to site; NAD.                     

00:15 Reassessment: Awake/alert; demanding school excuse for 2021; oriented x 3;        cc4 

      discharge instructions given \T\ discharged home with parents.                              

                                                                                                  

Overdose:                                                                                         

                                                                                             

19:50 [place] Suicide Severity Screening: "In the past month, have you wished you were dead  cc4 

      or wished you could go to sleep and not wake up?" Patient responds "no." Patient            

      responds "yes." Based off client's responses, additional C-SSRS screening questions         

      required. "In the past month, have you actually had any thoughts of killing yourself?"      

      Patient responds "no." "In your lifetime, have you ever done anything, started to do        

      anything, or prepared to do anything to end your life?" Patient responds "no.". Patient     

      took Patient states, "I thought I was taking Adderall", "I bought some pills on the         

      street.".                                                                                   

19:50 [place] Suicide Severity Screening: "In the past month, have you wished you were dead  cc4 

      or wished you could go to sleep and not wake up?" Patient responds "no." "In the past       

      month, have you actually had any thoughts of killing yourself?" Patient responds "no."      

      Patient responds "yes." Based off client's responses, additional C-SSRS screening           

      questions required. "In your lifetime, have you ever done anything, started to do           

      anything, or prepared to do anything to end your life?" Patient responds "no.".             

                                                                                                  

Vital Signs:                                                                                      

18:49  / 96; Pulse 120; Resp 22; Temp 98.5(TE); Pulse Ox 100% on R/A; Weight 63.5 kg;   ss  

      Height 5 ft. 4 in. (162.56 cm); Pain 0/10;                                                  

19:52  / 74; Pulse 92; Resp 20; Pulse Ox 100% on R/A;                                   cc4 

20:30  / 101; Pulse 100; Resp 22; Pulse Ox 100% on R/A;                                 cc4 

21:23  / 53; Pulse 106; Resp 20; Pulse Ox 98% on R/A;                                   ld1 

22:30  / 51; Pulse 101; Resp 20 S; Pulse Ox 98% on R/A;                                 cc4 

23:30  / 53; Pulse 99; Resp 16; Pulse Ox 98% ;                                          cc4 

                                                                                             

00:15  / 63; Pulse 97; Resp 20; Temp 97.6(O); Pulse Ox 98% ;                            cc4 

                                                                                             

18:49 Body Mass Index 24.03 (63.50 kg, 162.56 cm)                                               

                                                                                                  

ED Course:                                                                                        

                                                                                             

18:42 Patient arrived in ED.                                                                  am2 

18:51 Triage completed.                                                                       ss  

18:52 Arm band placed on right wrist.                                                           

18:57 Matthew Martinez MD is Attending Physician.                                             [facility] 

19:43 Janette Gilbert, RN is Primary Nurse.                                                  cc4 

19:50 Patient has correct armband on for positive identification. Bed in low position. Call   cc4 

      light in reach. Side rails up X2. Adult w/ patient. Cardiac monitor on. Pulse ox on.        

      NIBP on.                                                                                    

20:23 CT Head Brain wo Cont In Process Unspecified.                                           EDMS

20:39 Urine Pregnancy--Ancillary (enter results) Sent.                                        cc4 

20:42 Acetaminophen Sent.                                                                     cc4 

20:42 Basic Metabolic Panel Sent.                                                             cc4 

20:42 ETOH Level Sent.                                                                        cc4 

20:42 Hepatic Function Sent.                                                                  cc4 

20:42 Urine Drug Screen Sent.                                                                 cc4 

                                                                                             

00:00 IV discontinued, intact, bleeding controlled, No redness/swelling at site. Pressure     cc4 

      dressing applied.                                                                           

00:15 No provider procedures requiring assistance completed.                                  cc4 

00:23 Jerry Carey MD is Referral Physician.                                           [facility] 

                                                                                                  

Administered Medications:                                                                         

                                                                                             

19:50 Drug: NS 0.9% 1000 ml Route: IV; Rate: 1000 ml; Site: right antecubital;                cc4 

                                                                                             

00:15 Follow up: Response: No adverse reaction; IV Status: Completed infusion; IV Intake:     cc4 

      1000ml                                                                                      

                                                                                             

20:40 Drug: Ativan (LORazepam) 1 mg Route: IVP; Site: right wrist;                            cc4 

21:00 Follow up: Response: No adverse reaction; Anxiety decreased                             cc4 

22:20 Drug: D5-NS 1000 ml Route: IV; Rate: per protocol; Site: right wrist;                   vg1 

                                                                                                  

                                                                                                  

Intake:                                                                                           

                                                                                             

00:15 IV: 1000ml; Total: 1000ml.                                                              cc4 

                                                                                                  

Outcome:                                                                                          

00:15 Discharged to home ambulatory.                                                          cc4 

00:15 Condition: stable                                                                           

00:15 Discharge instructions given to patient, Parents. Instructed on discharge instructions,     

      follow up and referral plans. Demonstrated understanding of instructions, follow-up         

      care.                                                                                       

00:25 Discharge ordered by MD.                                                                ney 

02:00 Patient left the ED.                                                                    cc4 

                                                                                                  

Signatures:                                                                                       

Dispatcher [employer]                           EDMS                                                 

Nat Pop, RN                      RN   Paulette Patino Victoria RN                    RN   vg1                                                  

Matthew Martinez MD MD   mh7                                                  

Muriel Jauregui RN                     RN   ld1                                                  

Janette Gilbert RN                    RN   cc4                                                  

                                                                                                  

**************************************************************************************************